# Patient Record
Sex: MALE | Race: WHITE | NOT HISPANIC OR LATINO | URBAN - METROPOLITAN AREA
[De-identification: names, ages, dates, MRNs, and addresses within clinical notes are randomized per-mention and may not be internally consistent; named-entity substitution may affect disease eponyms.]

---

## 2017-01-17 ENCOUNTER — EMERGENCY (EMERGENCY)
Facility: HOSPITAL | Age: 27
LOS: 1 days | Discharge: HOME | End: 2017-01-17
Admitting: EMERGENCY MEDICINE
Payer: MEDICAID

## 2017-01-17 VITALS
DIASTOLIC BLOOD PRESSURE: 95 MMHG | HEART RATE: 76 BPM | SYSTOLIC BLOOD PRESSURE: 143 MMHG | TEMPERATURE: 98 F | OXYGEN SATURATION: 100 % | RESPIRATION RATE: 16 BRPM

## 2017-01-17 PROCEDURE — 99284 EMERGENCY DEPT VISIT MOD MDM: CPT

## 2017-01-17 RX ORDER — ACETAMINOPHEN 500 MG
500 TABLET ORAL ONCE
Qty: 0 | Refills: 0 | Status: COMPLETED | OUTPATIENT
Start: 2017-01-17 | End: 2017-01-17

## 2017-01-17 RX ORDER — METOCLOPRAMIDE HCL 10 MG
10 TABLET ORAL ONCE
Qty: 0 | Refills: 0 | Status: COMPLETED | OUTPATIENT
Start: 2017-01-17 | End: 2017-01-17

## 2017-01-17 RX ORDER — SODIUM CHLORIDE 9 MG/ML
1000 INJECTION INTRAMUSCULAR; INTRAVENOUS; SUBCUTANEOUS ONCE
Qty: 0 | Refills: 0 | Status: COMPLETED | OUTPATIENT
Start: 2017-01-17 | End: 2017-01-17

## 2017-01-17 RX ADMIN — Medication 10 MILLIGRAM(S): at 22:32

## 2017-01-17 RX ADMIN — SODIUM CHLORIDE 1000 MILLILITER(S): 9 INJECTION INTRAMUSCULAR; INTRAVENOUS; SUBCUTANEOUS at 22:33

## 2017-01-17 RX ADMIN — Medication 500 MILLIGRAM(S): at 22:32

## 2017-01-17 NOTE — ED PROVIDER NOTE - NS ED MD SCRIBE ATTENDING SCRIBE SECTIONS
HIV/DISPOSITION/REVIEW OF SYSTEMS/VITAL SIGNS( Pullset)/PHYSICAL EXAM/HISTORY OF PRESENT ILLNESS/PAST MEDICAL/SURGICAL/SOCIAL HISTORY

## 2017-01-17 NOTE — ED PROVIDER NOTE - PLAN OF CARE
Follow up with your primary medical doctor within 2-3 days of ER discharge.  If you need to find a doctor to follow up with, you may call the [facility] Patient Access Services helpline at [phone] to find names/contact #s for a practitioner (or specialist) to follow up with.  Bring your discharge papers / test results with you to your follow up appointment(s).  Get plenty of rest.  Avoid strenuous activity.  Stay well hydrated.  Drink plenty of CLEAR fluids.  You may take an over the counter decongestant / antihistamine combination medicine (most effective options are available behind the pharmacy counter (you do not need a prescription)) such as pseudoephedrine / loratadine 12 hour (available under many trade names; speak to the pharmacist).  You may take ibuprofen 200 mg tablets:  Take 3 tabs (equals a 600 mg dose) every 6 - 8 hours as needed for pain / inflammation (take with food).  ***Return to the Emergency Department if you experience ANY new / worsening symptoms or have any problems / issues / concerns.***

## 2017-01-17 NOTE — ED PROVIDER NOTE - PROGRESS NOTE DETAILS
The scribe's documentation has been prepared under my direction and personally reviewed by me in its entirety. I confirm that the note above accurately reflects all work, treatment, procedures, and medical decision making performed by me. LEONOR Braxton Pt feeling better at this time. Pt awaiting ct head/ results. U LEONOR Le Addendum------  This patient was signed out to me by LEONOR Braxton at 0200 hrs; pt pending CT head reading.  CT official reading states no acute intracranial pathology; findings of bilateral frontal / ethmoid sinus mucosal thickening.  Pt. admits to recent URI symptoms; states still having slight nasal congestion / postnasal drip, but states much improved and continuing to improve over past few days.  Pt. states no active pain / headache at present.  I discussed with pt supportive care measures; pt will be d/c'd home per below instructions.

## 2017-01-17 NOTE — ED PROVIDER NOTE - OBJECTIVE STATEMENT
27 y/o M pt with no significant PMHx c./o worsening throbbing frontal right sided headache (rated 5/10) x4 days. Pt stated during the onset of the headache, pt also had abd pain and body aches. Pt vomited once 3 days ago but sx resolved. States headaches persisted but felt better with Tylenol. Pt states he woke up last night with severe headache and tried Tylenol with no relief. Pt last took Tylenol 7 hours ago. Pt was concerned because he never had hx of headaches. Denies photophobia, nausea, vomiting, fever, chills, weakness, numbness, fall, trauma or any other complaints. 25 y/o M pt with no significant PMHx c./o worsening throbbing frontal right sided headache (rated 5/10) x4 days. Pt stated during the onset of the headache, pt also had abd pain and body aches. Pt vomited once 3 days ago but sx resolved. States headaches persisted but felt better with Tylenol. Pt states he woke up last night with severe headache and tried Tylenol with slight relief. Pt last took Tylenol 7 hours ago. Pt was concerned because he never had hx of headaches. Denies photophobia, nausea, vomiting, fever, chills, weakness, numbness, fall, trauma or any other complaints.

## 2017-01-17 NOTE — ED PROVIDER NOTE - MEDICAL DECISION MAKING DETAILS
27 y/o M pt presents to the ED with worsening throbbing right frontal headache x4 days. Plan: Reglan, fluids, reassess 27 y/o M pt presents to the ED with worsening throbbing right frontal headache x4 days. Plan: Reglan, fluids, ct head to r/o pathology due to no hx of hx, re eval

## 2017-01-17 NOTE — ED PROVIDER NOTE - CARE PLAN
Principal Discharge DX:	Headache Principal Discharge DX:	Headache  Instructions for follow-up, activity and diet:	Follow up with your primary medical doctor within 2-3 days of ER discharge.  If you need to find a doctor to follow up with, you may call the [facility] Patient Access Services helpline at [phone] to find names/contact #s for a practitioner (or specialist) to follow up with.  Bring your discharge papers / test results with you to your follow up appointment(s).  Get plenty of rest.  Avoid strenuous activity.  Stay well hydrated.  Drink plenty of CLEAR fluids.  You may take an over the counter decongestant / antihistamine combination medicine (most effective options are available behind the pharmacy counter (you do not need a prescription)) such as pseudoephedrine / loratadine 12 hour (available under many trade names; speak to the pharmacist).  You may take ibuprofen 200 mg tablets:  Take 3 tabs (equals a 600 mg dose) every 6 - 8 hours as needed for pain / inflammation (take with food).  ***Return to the Emergency Department if you experience ANY new / worsening symptoms or have any problems / issues / concerns.***  Secondary Diagnosis:	Upper respiratory infection, viral

## 2017-01-18 PROCEDURE — 70450 CT HEAD/BRAIN W/O DYE: CPT | Mod: 26

## 2017-04-24 NOTE — ED ADULT TRIAGE NOTE - HEART RATE (BEATS/MIN)
Health Maintenance Summary     Topic Due On Due Status Completed On    Immunization-Zoster Nov 25, 1994 Overdue     Immunization - Pneumococcal Oct 6, 2012 Overdue Oct 6, 2011    Medicare Wellness Visit Dec 18, 2016 Overdue Dec 18, 2015    IMMUNIZATION - DTaP/Tdap/Td Nov 25, 1953 Overdue     Immunization-Influenza Sep 1, 2017 Not Due Nov 12, 2015          Patient is due for topics as listed above, he wishes to discuss with provider .       76
